# Patient Record
Sex: FEMALE | Race: WHITE
[De-identification: names, ages, dates, MRNs, and addresses within clinical notes are randomized per-mention and may not be internally consistent; named-entity substitution may affect disease eponyms.]

---

## 2019-03-22 ENCOUNTER — HOSPITAL ENCOUNTER (INPATIENT)
Dept: HOSPITAL 35 - ER | Age: 49
LOS: 5 days | Discharge: HOME | DRG: 442 | End: 2019-03-27
Attending: INTERNAL MEDICINE | Admitting: INTERNAL MEDICINE
Payer: COMMERCIAL

## 2019-03-22 VITALS — SYSTOLIC BLOOD PRESSURE: 125 MMHG | DIASTOLIC BLOOD PRESSURE: 79 MMHG

## 2019-03-22 VITALS — DIASTOLIC BLOOD PRESSURE: 71 MMHG | SYSTOLIC BLOOD PRESSURE: 120 MMHG

## 2019-03-22 VITALS — HEIGHT: 60.98 IN | BODY MASS INDEX: 32.28 KG/M2 | WEIGHT: 171 LBS

## 2019-03-22 VITALS — SYSTOLIC BLOOD PRESSURE: 126 MMHG | DIASTOLIC BLOOD PRESSURE: 74 MMHG

## 2019-03-22 VITALS — SYSTOLIC BLOOD PRESSURE: 108 MMHG | DIASTOLIC BLOOD PRESSURE: 63 MMHG

## 2019-03-22 VITALS — SYSTOLIC BLOOD PRESSURE: 131 MMHG | DIASTOLIC BLOOD PRESSURE: 75 MMHG

## 2019-03-22 DIAGNOSIS — N30.00: ICD-10-CM

## 2019-03-22 DIAGNOSIS — Z94.4: ICD-10-CM

## 2019-03-22 DIAGNOSIS — E11.65: ICD-10-CM

## 2019-03-22 DIAGNOSIS — Z79.4: ICD-10-CM

## 2019-03-22 DIAGNOSIS — Z88.8: ICD-10-CM

## 2019-03-22 DIAGNOSIS — K72.90: Primary | ICD-10-CM

## 2019-03-22 DIAGNOSIS — K74.60: ICD-10-CM

## 2019-03-22 DIAGNOSIS — Z79.899: ICD-10-CM

## 2019-03-22 LAB
ALBUMIN SERPL-MCNC: 3.2 G/DL (ref 3.4–5)
ALT SERPL-CCNC: 52 U/L (ref 30–65)
ANION GAP SERPL CALC-SCNC: 8 MMOL/L (ref 7–16)
AST SERPL-CCNC: 103 U/L (ref 15–37)
BASOPHILS NFR BLD AUTO: 1 % (ref 0–2)
BE(VIVO): 8.9 MMOL/L
BILIRUB SERPL-MCNC: 3.8 MG/DL
BILIRUB UR-MCNC: (no result) MG/DL
BUN SERPL-MCNC: 11 MG/DL (ref 7–18)
CALCIUM SERPL-MCNC: 10 MG/DL (ref 8.5–10.1)
CHLORIDE SERPL-SCNC: 92 MMOL/L (ref 98–107)
CO2 SERPL-SCNC: 33 MMOL/L (ref 21–32)
COLOR UR: YELLOW
CREAT SERPL-MCNC: 0.9 MG/DL (ref 0.6–1)
EOSINOPHIL NFR BLD: 3 % (ref 0–3)
ERYTHROCYTE [DISTWIDTH] IN BLOOD BY AUTOMATED COUNT: 13.9 % (ref 10.5–14.5)
GAS PNL BLDV: 87.4 MMHG (ref 35–45)
GLUCOSE SERPL-MCNC: 203 MG/DL (ref 74–106)
GRANULOCYTES NFR BLD MANUAL: 68 % (ref 36–66)
HCO3 BLD-SCNC: 33.6 MMOL/L (ref 22–26)
HCT VFR BLD CALC: 39.6 % (ref 37–47)
HGB BLD-MCNC: 14.2 GM/DL (ref 12–15)
KETONES UR STRIP-MCNC: (no result) MG/DL
LYMPHOCYTES NFR BLD AUTO: 14 % (ref 24–44)
MAGNESIUM SERPL-MCNC: 1.6 MG/DL (ref 1.8–2.4)
MCH RBC QN AUTO: 40.6 PG (ref 26–34)
MCHC RBC AUTO-ENTMCNC: 35.8 G/DL (ref 28–37)
MCV RBC: 113.3 FL (ref 80–100)
MONOCYTES NFR BLD: 9 % (ref 1–8)
NEUTROPHILS # BLD: 2.3 THOU/UL (ref 1.4–8.2)
NEUTS BAND NFR BLD: 5 % (ref 0–8)
PCO2 BLDV: 46.1 MMHG (ref 41–51)
PHOSPHATE SERPL-MCNC: 2.8 MG/DL (ref 2.5–4.9)
PLATELET # BLD: 121 THOU/UL (ref 150–400)
POTASSIUM SERPL-SCNC: 4.3 MMOL/L (ref 3.5–5.1)
PROT SERPL-MCNC: 7.3 G/DL (ref 6.4–8.2)
RBC # BLD AUTO: 3.5 MIL/UL (ref 4.2–5)
RBC # UR STRIP: NEGATIVE /UL
SODIUM SERPL-SCNC: 133 MMOL/L (ref 136–145)
SP GR UR STRIP: 1.02 (ref 1–1.03)
SQUAMOUS: (no result) /LPF (ref 0–3)
URINE CLARITY: CLEAR
URINE GLUCOSE-RANDOM*: (no result)
URINE LEUKOCYTES-REFLEX: NEGATIVE
URINE NITRITE-REFLEX: POSITIVE
URINE PROTEIN (DIPSTICK): (no result)
URINE WBC-REFLEX: (no result) /HPF (ref 0–5)
UROBILINOGEN UR STRIP-ACNC: 1 E.U./DL (ref 0.2–1)
WBC # BLD AUTO: 3.1 THOU/UL (ref 4–11)

## 2019-03-22 PROCEDURE — 10879: CPT

## 2019-03-23 VITALS — SYSTOLIC BLOOD PRESSURE: 120 MMHG | DIASTOLIC BLOOD PRESSURE: 71 MMHG

## 2019-03-23 VITALS — DIASTOLIC BLOOD PRESSURE: 68 MMHG | SYSTOLIC BLOOD PRESSURE: 122 MMHG

## 2019-03-23 VITALS — DIASTOLIC BLOOD PRESSURE: 71 MMHG | SYSTOLIC BLOOD PRESSURE: 138 MMHG

## 2019-03-23 VITALS — SYSTOLIC BLOOD PRESSURE: 109 MMHG | DIASTOLIC BLOOD PRESSURE: 54 MMHG

## 2019-03-23 VITALS — SYSTOLIC BLOOD PRESSURE: 123 MMHG | DIASTOLIC BLOOD PRESSURE: 69 MMHG

## 2019-03-23 LAB
ALBUMIN SERPL-MCNC: 2.5 G/DL (ref 3.4–5)
ALT SERPL-CCNC: 42 U/L (ref 30–65)
ANION GAP SERPL CALC-SCNC: 9 MMOL/L (ref 7–16)
AST SERPL-CCNC: 68 U/L (ref 15–37)
BILIRUB SERPL-MCNC: 2.4 MG/DL
BUN SERPL-MCNC: 11 MG/DL (ref 7–18)
CALCIUM SERPL-MCNC: 8.1 MG/DL (ref 8.5–10.1)
CHLORIDE SERPL-SCNC: 92 MMOL/L (ref 98–107)
CO2 SERPL-SCNC: 30 MMOL/L (ref 21–32)
CREAT SERPL-MCNC: 0.9 MG/DL (ref 0.6–1)
ERYTHROCYTE [DISTWIDTH] IN BLOOD BY AUTOMATED COUNT: 13.7 % (ref 10.5–14.5)
GLUCOSE SERPL-MCNC: 357 MG/DL (ref 74–106)
HCT VFR BLD CALC: 30.9 % (ref 37–47)
HGB BLD-MCNC: 11 GM/DL (ref 12–15)
MCH RBC QN AUTO: 40.9 PG (ref 26–34)
MCHC RBC AUTO-ENTMCNC: 35.7 G/DL (ref 28–37)
MCV RBC: 114.7 FL (ref 80–100)
PLATELET # BLD: 77 THOU/UL (ref 150–400)
POTASSIUM SERPL-SCNC: 3.8 MMOL/L (ref 3.5–5.1)
PROT SERPL-MCNC: 5.1 G/DL (ref 6.4–8.2)
RBC # BLD AUTO: 2.69 MIL/UL (ref 4.2–5)
SODIUM SERPL-SCNC: 131 MMOL/L (ref 136–145)
WBC # BLD AUTO: 2 THOU/UL (ref 4–11)

## 2019-03-23 NOTE — NUR
ASSUMED CARE OF PT DURING ADMISSION TO THE UNIT. A&Ox4, OCCASIONAL CONFUSION.
VS STABLE, SR ON TELE. GLUCOSE HIGH, INSULIN GIVEN. RECHECKED, HIGH AGAIN,
CONTACTED PHYSICIAN AND 4 ADDITIONAL UNITS OF NOVOLOG ORDERED AND GIVEN.
STATED SHE HAS NOT HAD DIABETIC/INSULIN EDUCATION. STATED SHE CAN FEEL WHEN
SUGAR IS DROPPING BUT NOT WHEN IT IS ELEVATED. SBA TO BR. SLOW BUT STEADY
GAIT. 1 BM OVER NOC. CURRENTLY RESTING. NO ACUTE DISTRESS. SLOW PROGRESSION
TOWARDS POC GOALS.

## 2019-03-23 NOTE — NUR
PT NEEDS EDUCATION ON DIABETES..DIET AND INSULIN..SHE TOLD PREVIOUS RN THAT
SHE WAS DIAGNOSED AS DIABETIC AT [place] IN [facility]  BUT SHE DISCHARGED A DAY
EARLY AND REFUSED HER DIABETIC EDUCATION.

## 2019-03-24 VITALS — SYSTOLIC BLOOD PRESSURE: 123 MMHG | DIASTOLIC BLOOD PRESSURE: 73 MMHG

## 2019-03-24 VITALS — SYSTOLIC BLOOD PRESSURE: 126 MMHG | DIASTOLIC BLOOD PRESSURE: 70 MMHG

## 2019-03-24 VITALS — SYSTOLIC BLOOD PRESSURE: 146 MMHG | DIASTOLIC BLOOD PRESSURE: 95 MMHG

## 2019-03-24 VITALS — SYSTOLIC BLOOD PRESSURE: 129 MMHG | DIASTOLIC BLOOD PRESSURE: 71 MMHG

## 2019-03-24 VITALS — SYSTOLIC BLOOD PRESSURE: 133 MMHG | DIASTOLIC BLOOD PRESSURE: 77 MMHG

## 2019-03-24 LAB
ALBUMIN SERPL-MCNC: 2 G/DL (ref 3.4–5)
ALT SERPL-CCNC: 39 U/L (ref 30–65)
ANION GAP SERPL CALC-SCNC: 5 MMOL/L (ref 7–16)
AST SERPL-CCNC: 62 U/L (ref 15–37)
BILIRUB SERPL-MCNC: 1.7 MG/DL
BUN SERPL-MCNC: 9 MG/DL (ref 7–18)
CALCIUM SERPL-MCNC: 8.7 MG/DL (ref 8.5–10.1)
CHLORIDE SERPL-SCNC: 98 MMOL/L (ref 98–107)
CO2 SERPL-SCNC: 34 MMOL/L (ref 21–32)
CREAT SERPL-MCNC: 0.9 MG/DL (ref 0.6–1)
ERYTHROCYTE [DISTWIDTH] IN BLOOD BY AUTOMATED COUNT: 13.5 % (ref 10.5–14.5)
GLUCOSE SERPL-MCNC: 538 MG/DL (ref 74–106)
HCT VFR BLD CALC: 30.1 % (ref 37–47)
HGB BLD-MCNC: 10.9 GM/DL (ref 12–15)
MAGNESIUM SERPL-MCNC: 1.6 MG/DL (ref 1.8–2.4)
MCH RBC QN AUTO: 41.3 PG (ref 26–34)
MCHC RBC AUTO-ENTMCNC: 36.2 G/DL (ref 28–37)
MCV RBC: 114.2 FL (ref 80–100)
PLATELET # BLD: 63 THOU/UL (ref 150–400)
POTASSIUM SERPL-SCNC: 4 MMOL/L (ref 3.5–5.1)
PROT SERPL-MCNC: 5.4 G/DL (ref 6.4–8.2)
RBC # BLD AUTO: 2.64 MIL/UL (ref 4.2–5)
SODIUM SERPL-SCNC: 137 MMOL/L (ref 136–145)
WBC # BLD AUTO: 1.5 THOU/UL (ref 4–11)

## 2019-03-24 NOTE — NUR
HAVE BEEN STARTING TO EDUCATE PATIENT ON HER DIABETES...SHE STATES AT HOME SHE
WILL GO FOR 1-2 DAYS WITH NO FOOD INTAKE..ALWAYS HAS POOR APPETITE..CONSULTED
NUTRITION TO EDUCATE HER ON PROPER DIET AND SENT A MESSAGE TO [facility] TO TRY TO
FIND A SUPPORT GROUP SHE CAN ATTEND TO FOLOW UP HER EDUCATION..

## 2019-03-24 NOTE — NUR
PATIENT IS ALERT AND ORIENTED. PATIENT STATED FEELS GROGGY STILL. PATIENT
NEEDS TO BE EDUCATED STILL ON DM MANAGEMENT. PATIENT IS UP TIMES ONE WITH
GAIT BELT. PATIENTS AMMONIA LEVELS HAVE IMPROVED. PATIEN IS NSR ON TELE.
PATIENT IS RESTING COMFORTABLY IN BED. WCM. PATIENT IS PROGRESSING TO GOALS.

## 2019-03-25 VITALS — SYSTOLIC BLOOD PRESSURE: 131 MMHG | DIASTOLIC BLOOD PRESSURE: 79 MMHG

## 2019-03-25 VITALS — SYSTOLIC BLOOD PRESSURE: 140 MMHG | DIASTOLIC BLOOD PRESSURE: 84 MMHG

## 2019-03-25 VITALS — SYSTOLIC BLOOD PRESSURE: 134 MMHG | DIASTOLIC BLOOD PRESSURE: 71 MMHG

## 2019-03-25 LAB
ALBUMIN SERPL-MCNC: 2.3 G/DL (ref 3.4–5)
ALT SERPL-CCNC: 38 U/L (ref 30–65)
ANION GAP SERPL CALC-SCNC: 7 MMOL/L (ref 7–16)
AST SERPL-CCNC: 54 U/L (ref 15–37)
BILIRUB SERPL-MCNC: 1.6 MG/DL
BUN SERPL-MCNC: 8 MG/DL (ref 7–18)
CALCIUM SERPL-MCNC: 8.9 MG/DL (ref 8.5–10.1)
CHLORIDE SERPL-SCNC: 100 MMOL/L (ref 98–107)
CO2 SERPL-SCNC: 32 MMOL/L (ref 21–32)
CREAT SERPL-MCNC: 0.8 MG/DL (ref 0.6–1)
ERYTHROCYTE [DISTWIDTH] IN BLOOD BY AUTOMATED COUNT: 13.8 % (ref 10.5–14.5)
EST. AVERAGE GLUCOSE BLD GHB EST-MCNC: 209 MG/DL
GLUCOSE SERPL-MCNC: 365 MG/DL (ref 74–106)
GLYCOHEMOGLOBIN (HGB A1C): 8.9 % (ref 4.8–5.6)
HCT VFR BLD CALC: 30.9 % (ref 37–47)
HGB BLD-MCNC: 11.1 GM/DL (ref 12–15)
MCH RBC QN AUTO: 41.3 PG (ref 26–34)
MCHC RBC AUTO-ENTMCNC: 36 G/DL (ref 28–37)
MCV RBC: 114.8 FL (ref 80–100)
PLATELET # BLD: 68 THOU/UL (ref 150–400)
POTASSIUM SERPL-SCNC: 3.6 MMOL/L (ref 3.5–5.1)
PROT SERPL-MCNC: 5.3 G/DL (ref 6.4–8.2)
RBC # BLD AUTO: 2.69 MIL/UL (ref 4.2–5)
SODIUM SERPL-SCNC: 139 MMOL/L (ref 136–145)
WBC # BLD AUTO: 1.8 THOU/UL (ref 4–11)

## 2019-03-25 NOTE — NUR
PATIENT IS ALERT AND ORIENTED. PATIENT IS SBA. PATIENTS AMMONIA HAS IMPROVED.
PATIENT IS NSR ON TELE. PATIENTS LBM WAS THE 24TH. PATIENT HAS NOT DISPLAYED
ANY SIGN OF WITHDRAWL. PATIENT REQUIRES SOME EDUCATION ON DM MANAGEMENT.
PATIENT HAS A DM BOOK WILL FOLLOW UP WITH QUESTIONS. PATIENT IS RESTING
COMFORTABLY IN BED. WCM. PATIENT IS PROGRESSING TO GOALS.

## 2019-03-25 NOTE — NUR
INITIAL ASSESSMENT:
Pt evaluated for d/c planning needs.  Reviewed chart and spoke with nurse, pt
and SO at bedside.  Pt lives with SO and was independent with ADl's.  Pt uses
no DME and had [facility] in the past.  Pt plans on returning home on d/c from
hospital.  Will remain available to assist as needed.

## 2019-03-25 NOTE — NUR
PT GIVEN INFO FROM DIETICIAN REGARDING DIABETIC DIET...ENCOURAGED IMPORTANCE
OF EATING REGULAR MEALS WITH HEALTHY CHOICES...

## 2019-03-26 VITALS — DIASTOLIC BLOOD PRESSURE: 61 MMHG | SYSTOLIC BLOOD PRESSURE: 99 MMHG

## 2019-03-26 VITALS — DIASTOLIC BLOOD PRESSURE: 69 MMHG | SYSTOLIC BLOOD PRESSURE: 127 MMHG

## 2019-03-26 VITALS — DIASTOLIC BLOOD PRESSURE: 69 MMHG | SYSTOLIC BLOOD PRESSURE: 133 MMHG

## 2019-03-26 VITALS — DIASTOLIC BLOOD PRESSURE: 81 MMHG | SYSTOLIC BLOOD PRESSURE: 116 MMHG

## 2019-03-26 VITALS — DIASTOLIC BLOOD PRESSURE: 78 MMHG | SYSTOLIC BLOOD PRESSURE: 118 MMHG

## 2019-03-26 LAB
ERYTHROCYTE [DISTWIDTH] IN BLOOD BY AUTOMATED COUNT: 13.7 % (ref 10.5–14.5)
HCT VFR BLD CALC: 31.6 % (ref 37–47)
HGB BLD-MCNC: 11.4 GM/DL (ref 12–15)
MCH RBC QN AUTO: 41.3 PG (ref 26–34)
MCHC RBC AUTO-ENTMCNC: 36 G/DL (ref 28–37)
MCV RBC: 114.8 FL (ref 80–100)
PLATELET # BLD: 71 THOU/UL (ref 150–400)
RBC # BLD AUTO: 2.75 MIL/UL (ref 4.2–5)
WBC # BLD AUTO: 1.6 THOU/UL (ref 4–11)

## 2019-03-26 NOTE — NUR
CONTINUE TO REINFORCE IMPORTANCE OF EATING MEALS AND STICKING TO DIET AS
ORDERED...SHE DID EAT MUCH BETTER AT LUNCH...

## 2019-03-26 NOTE — NUR
SW reviewed chart and spoke with nursing. Pt is progressing towards goals for
discharge. SW met with pt at bedside to discuss discharge plan. Pt states she
would be agreeable with HH services if ordered. [facility]
and would be agreeable to using them again. Pt requested SW contact her
fianceRenard.  Pt needs oversight in checking blood sugar and administering
insulin. Pt's PCP is Dr. Sullivan. SW left voice message for pt's fiance, to
provide update and discuss discharge plan. SW notified intake at [facility] of new
HH referral. SW is following to assist as needed with discharge planning.

## 2019-03-27 VITALS — DIASTOLIC BLOOD PRESSURE: 65 MMHG | SYSTOLIC BLOOD PRESSURE: 126 MMHG

## 2019-03-27 VITALS — SYSTOLIC BLOOD PRESSURE: 128 MMHG | DIASTOLIC BLOOD PRESSURE: 66 MMHG

## 2019-03-27 VITALS — SYSTOLIC BLOOD PRESSURE: 116 MMHG | DIASTOLIC BLOOD PRESSURE: 57 MMHG

## 2019-03-27 VITALS — SYSTOLIC BLOOD PRESSURE: 126 MMHG | DIASTOLIC BLOOD PRESSURE: 65 MMHG

## 2019-03-27 NOTE — NUR
ASSUMED PATIENT CARE AT 0700. A/O X4. NO DISDRESS NOTED. DIABETES EDUCATION
GIVEN TO PATIENT AND FIANCE. DC TO [facility] NOW.

## 2019-03-27 NOTE — NUR
PT IN BED, SLEEPING. PT TOLERATED AMBULATING TO BATHROOM THROUGHOUT SHIFT. PT
SR ON MONITOR. PT HAD NO C/O PAIN THROUGHOUT SHIFT.

## 2019-03-30 ENCOUNTER — HOSPITAL ENCOUNTER (EMERGENCY)
Dept: HOSPITAL 35 - ER | Age: 49
Discharge: HOME | End: 2019-03-30
Payer: COMMERCIAL

## 2019-03-30 VITALS — BODY MASS INDEX: 31.47 KG/M2 | WEIGHT: 171.01 LBS | HEIGHT: 62 IN

## 2019-03-30 VITALS — SYSTOLIC BLOOD PRESSURE: 118 MMHG | DIASTOLIC BLOOD PRESSURE: 72 MMHG

## 2019-03-30 DIAGNOSIS — R11.0: ICD-10-CM

## 2019-03-30 DIAGNOSIS — T38.3X5A: ICD-10-CM

## 2019-03-30 DIAGNOSIS — I10: ICD-10-CM
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
## 2019-03-30 DIAGNOSIS — E11.649: Primary | ICD-10-CM

## 2019-03-30 DIAGNOSIS — K21.9: ICD-10-CM

## 2019-03-30 DIAGNOSIS — Z88.8: ICD-10-CM

## 2019-03-30 DIAGNOSIS — Z79.4: ICD-10-CM

## 2019-03-30 LAB
ALBUMIN SERPL-MCNC: 2.7 G/DL (ref 3.4–5)
ALT SERPL-CCNC: 46 U/L (ref 30–65)
ANION GAP SERPL CALC-SCNC: 11 MMOL/L (ref 7–16)
AST SERPL-CCNC: 104 U/L (ref 15–37)
BASOPHILS NFR BLD AUTO: 0.5 % (ref 0–2)
BILIRUB DIRECT SERPL-MCNC: 0.6 MG/DL
BILIRUB SERPL-MCNC: 1.2 MG/DL
BILIRUB UR-MCNC: NEGATIVE MG/DL
BUN SERPL-MCNC: 6 MG/DL (ref 7–18)
CALCIUM SERPL-MCNC: 9.2 MG/DL (ref 8.5–10.1)
CHLORIDE SERPL-SCNC: 101 MMOL/L (ref 98–107)
CO2 SERPL-SCNC: 26 MMOL/L (ref 21–32)
COLOR UR: YELLOW
CREAT SERPL-MCNC: 0.7 MG/DL (ref 0.6–1)
EOSINOPHIL NFR BLD: 0.3 % (ref 0–3)
ERYTHROCYTE [DISTWIDTH] IN BLOOD BY AUTOMATED COUNT: 14.3 % (ref 10.5–14.5)
GLUCOSE SERPL-MCNC: 95 MG/DL (ref 74–106)
GRANULOCYTES NFR BLD MANUAL: 69.5 % (ref 36–66)
HCT VFR BLD CALC: 33.1 % (ref 37–47)
HGB BLD-MCNC: 11.8 GM/DL (ref 12–15)
KETONES UR STRIP-MCNC: NEGATIVE MG/DL
LIPASE: 103 U/L (ref 73–393)
LYMPHOCYTES NFR BLD AUTO: 18.8 % (ref 24–44)
MCH RBC QN AUTO: 41.2 PG (ref 26–34)
MCHC RBC AUTO-ENTMCNC: 35.5 G/DL (ref 28–37)
MCV RBC: 115.9 FL (ref 80–100)
MONOCYTES NFR BLD: 10.9 % (ref 1–8)
NEUTROPHILS # BLD: 2.1 THOU/UL (ref 1.4–8.2)
PLATELET # BLD: 115 THOU/UL (ref 150–400)
POTASSIUM SERPL-SCNC: 3.5 MMOL/L (ref 3.5–5.1)
PROT SERPL-MCNC: 6.4 G/DL (ref 6.4–8.2)
RBC # BLD AUTO: 2.86 MIL/UL (ref 4.2–5)
RBC # UR STRIP: NEGATIVE /UL
SODIUM SERPL-SCNC: 138 MMOL/L (ref 136–145)
SP GR UR STRIP: <= 1.005 (ref 1–1.03)
URINE CLARITY: CLEAR
URINE GLUCOSE-RANDOM*: NEGATIVE
URINE LEUKOCYTES-REFLEX: NEGATIVE
URINE NITRITE-REFLEX: NEGATIVE
URINE PROTEIN (DIPSTICK): NEGATIVE
UROBILINOGEN UR STRIP-ACNC: 0.2 E.U./DL (ref 0.2–1)
WBC # BLD AUTO: 3 THOU/UL (ref 4–11)

## 2019-04-10 ENCOUNTER — HOSPITAL ENCOUNTER (EMERGENCY)
Dept: HOSPITAL 35 - ER | Age: 49
Discharge: HOME | End: 2019-04-10
Payer: COMMERCIAL

## 2019-04-10 VITALS — DIASTOLIC BLOOD PRESSURE: 85 MMHG | SYSTOLIC BLOOD PRESSURE: 143 MMHG

## 2019-04-10 VITALS — WEIGHT: 171.01 LBS | BODY MASS INDEX: 31.47 KG/M2 | HEIGHT: 62 IN

## 2019-04-10 DIAGNOSIS — D53.9: ICD-10-CM

## 2019-04-10 DIAGNOSIS — I10: ICD-10-CM

## 2019-04-10 DIAGNOSIS — E11.65: Primary | ICD-10-CM

## 2019-04-10 DIAGNOSIS — Z88.8: ICD-10-CM

## 2019-04-10 DIAGNOSIS — F10.129: ICD-10-CM

## 2019-04-10 DIAGNOSIS — K21.9: ICD-10-CM

## 2019-04-10 LAB
ANION GAP SERPL CALC-SCNC: 4 MMOL/L (ref 7–16)
ANISOCYTOSIS BLD QL SMEAR: (no result)
BASOPHILS NFR BLD AUTO: 1 % (ref 0–2)
BILIRUB UR-MCNC: NEGATIVE MG/DL
BUN SERPL-MCNC: 7 MG/DL (ref 7–18)
CALCIUM SERPL-MCNC: 8 MG/DL (ref 8.5–10.1)
CHLORIDE SERPL-SCNC: 98 MMOL/L (ref 98–107)
CO2 SERPL-SCNC: 35 MMOL/L (ref 21–32)
COLOR UR: YELLOW
CREAT SERPL-MCNC: 0.7 MG/DL (ref 0.6–1)
EOSINOPHIL NFR BLD: 1 % (ref 0–3)
ERYTHROCYTE [DISTWIDTH] IN BLOOD BY AUTOMATED COUNT: 14.2 % (ref 10.5–14.5)
GLUCOSE SERPL-MCNC: 146 MG/DL (ref 74–106)
GRANULOCYTES NFR BLD MANUAL: 47 % (ref 36–66)
HCT VFR BLD CALC: 36.1 % (ref 37–47)
HGB BLD-MCNC: 12.7 GM/DL (ref 12–15)
KETONES UR STRIP-MCNC: NEGATIVE MG/DL
LYMPHOCYTES NFR BLD AUTO: 44 % (ref 24–44)
MACROCYTES: (no result)
MCH RBC QN AUTO: 40.6 PG (ref 26–34)
MCHC RBC AUTO-ENTMCNC: 35.3 G/DL (ref 28–37)
MCV RBC: 115.2 FL (ref 80–100)
MONOCYTES NFR BLD: 7 % (ref 1–8)
NEUTROPHILS # BLD: 1.2 THOU/UL (ref 1.4–8.2)
NITRITE UR QL STRIP: NEGATIVE
PLATELET # BLD EST: (no result) 10*3/UL
PLATELET # BLD: 99 THOU/UL (ref 150–400)
POTASSIUM SERPL-SCNC: 3 MMOL/L (ref 3.5–5.1)
RBC # BLD AUTO: 3.14 MIL/UL (ref 4.2–5)
RBC # UR STRIP: NEGATIVE /UL
SODIUM SERPL-SCNC: 137 MMOL/L (ref 136–145)
SP GR UR STRIP: <= 1.005 (ref 1–1.03)
URINE CLARITY: CLEAR
URINE GLUCOSE-RANDOM*: NEGATIVE
URINE LEUKOCYTES: NEGATIVE
URINE PROTEIN (DIPSTICK): NEGATIVE
UROBILINOGEN UR STRIP-ACNC: 1 E.U./DL (ref 0.2–1)
WBC # BLD AUTO: 2.6 THOU/UL (ref 4–11)

## 2019-04-20 ENCOUNTER — HOSPITAL ENCOUNTER (INPATIENT)
Dept: HOSPITAL 35 - ER | Age: 49
LOS: 2 days | Discharge: HOME | DRG: 638 | End: 2019-04-22
Attending: INTERNAL MEDICINE | Admitting: INTERNAL MEDICINE
Payer: COMMERCIAL

## 2019-04-20 VITALS — BODY MASS INDEX: 31.07 KG/M2 | WEIGHT: 171 LBS | HEIGHT: 62.01 IN

## 2019-04-20 VITALS — SYSTOLIC BLOOD PRESSURE: 139 MMHG | DIASTOLIC BLOOD PRESSURE: 81 MMHG

## 2019-04-20 DIAGNOSIS — K21.9: ICD-10-CM
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
## 2019-04-20 DIAGNOSIS — E87.6: ICD-10-CM

## 2019-04-20 DIAGNOSIS — Z94.4: ICD-10-CM

## 2019-04-20 DIAGNOSIS — E11.65: Primary | ICD-10-CM

## 2019-04-20 DIAGNOSIS — Z88.8: ICD-10-CM

## 2019-04-20 DIAGNOSIS — E83.42: ICD-10-CM

## 2019-04-20 DIAGNOSIS — Z91.14: ICD-10-CM

## 2019-04-20 DIAGNOSIS — D89.9: ICD-10-CM

## 2019-04-20 DIAGNOSIS — F10.129: ICD-10-CM

## 2019-04-20 DIAGNOSIS — Z79.899: ICD-10-CM

## 2019-04-20 DIAGNOSIS — K70.30: ICD-10-CM

## 2019-04-20 DIAGNOSIS — Z91.11: ICD-10-CM

## 2019-04-20 LAB
ALBUMIN SERPL-MCNC: 2.9 G/DL (ref 3.4–5)
ALT SERPL-CCNC: 46 U/L (ref 30–65)
ANION GAP SERPL CALC-SCNC: 7 MMOL/L (ref 7–16)
AST SERPL-CCNC: 128 U/L (ref 15–37)
BASOPHILS NFR BLD AUTO: 0.4 % (ref 0–2)
BILIRUB SERPL-MCNC: 2 MG/DL
BILIRUB UR-MCNC: NEGATIVE MG/DL
BUN SERPL-MCNC: 8 MG/DL (ref 7–18)
CALCIUM SERPL-MCNC: 8.2 MG/DL (ref 8.5–10.1)
CHLORIDE SERPL-SCNC: 95 MMOL/L (ref 98–107)
CO2 SERPL-SCNC: 36 MMOL/L (ref 21–32)
COLOR UR: YELLOW
CREAT SERPL-MCNC: 0.6 MG/DL (ref 0.6–1)
EOSINOPHIL NFR BLD: 1.3 % (ref 0–3)
ERYTHROCYTE [DISTWIDTH] IN BLOOD BY AUTOMATED COUNT: 14 % (ref 10.5–14.5)
GLUCOSE SERPL-MCNC: 130 MG/DL (ref 74–106)
GRANULOCYTES NFR BLD MANUAL: 51.1 % (ref 36–66)
HCT VFR BLD CALC: 39.1 % (ref 37–47)
HGB BLD-MCNC: 13.6 GM/DL (ref 12–15)
KETONES UR STRIP-MCNC: NEGATIVE MG/DL
LYMPHOCYTES NFR BLD AUTO: 36.7 % (ref 24–44)
MACROCYTES: (no result)
MAGNESIUM SERPL-MCNC: 0.8 MG/DL (ref 1.8–2.4)
MCH RBC QN AUTO: 40 PG (ref 26–34)
MCHC RBC AUTO-ENTMCNC: 34.9 G/DL (ref 28–37)
MCV RBC: 114.8 FL (ref 80–100)
MONOCYTES NFR BLD: 10.5 % (ref 1–8)
NEUTROPHILS # BLD: 2.1 THOU/UL (ref 1.4–8.2)
PLATELET # BLD: 160 THOU/UL (ref 150–400)
POTASSIUM SERPL-SCNC: 3 MMOL/L (ref 3.5–5.1)
PROT SERPL-MCNC: 6.6 G/DL (ref 6.4–8.2)
RBC # BLD AUTO: 3.41 MIL/UL (ref 4.2–5)
RBC # UR STRIP: NEGATIVE /UL
SODIUM SERPL-SCNC: 138 MMOL/L (ref 136–145)
SP GR UR STRIP: <= 1.005 (ref 1–1.03)
TROPONIN I SERPL-MCNC: <0.06 NG/ML (ref ?–0.06)
URINE CLARITY: CLEAR
URINE GLUCOSE-RANDOM*: NEGATIVE
URINE LEUKOCYTES-REFLEX: (no result)
URINE NITRITE-REFLEX: NEGATIVE
URINE PROTEIN (DIPSTICK): NEGATIVE
UROBILINOGEN UR STRIP-ACNC: 0.2 E.U./DL (ref 0.2–1)
WBC # BLD AUTO: 4 THOU/UL (ref 4–11)

## 2019-04-20 PROCEDURE — 10879: CPT

## 2019-04-21 VITALS — SYSTOLIC BLOOD PRESSURE: 150 MMHG | DIASTOLIC BLOOD PRESSURE: 76 MMHG

## 2019-04-21 VITALS — DIASTOLIC BLOOD PRESSURE: 58 MMHG | SYSTOLIC BLOOD PRESSURE: 120 MMHG

## 2019-04-21 VITALS — SYSTOLIC BLOOD PRESSURE: 134 MMHG | DIASTOLIC BLOOD PRESSURE: 76 MMHG

## 2019-04-21 VITALS — SYSTOLIC BLOOD PRESSURE: 126 MMHG | DIASTOLIC BLOOD PRESSURE: 84 MMHG

## 2019-04-21 VITALS — SYSTOLIC BLOOD PRESSURE: 154 MMHG | DIASTOLIC BLOOD PRESSURE: 85 MMHG

## 2019-04-21 VITALS — SYSTOLIC BLOOD PRESSURE: 145 MMHG | DIASTOLIC BLOOD PRESSURE: 85 MMHG

## 2019-04-21 VITALS — SYSTOLIC BLOOD PRESSURE: 157 MMHG | DIASTOLIC BLOOD PRESSURE: 95 MMHG

## 2019-04-21 VITALS — DIASTOLIC BLOOD PRESSURE: 84 MMHG | SYSTOLIC BLOOD PRESSURE: 126 MMHG

## 2019-04-21 LAB — FOLATE SERPL-MCNC: 11.9 NG/ML (ref 8.6–58.9)

## 2019-04-21 NOTE — NUR
PATIENT WAS A NEW ADMISSION TO THE UNIT THIS SHIFT. FULLY ORIENTED, PATIENT
WAS ABLE TO PARTICIPATE FULLY IN ADMISSION. CIWA PER PROTOCOL WITH PATIENT
SCORING ZERO. VITAL SIGNS STABLE WITH PATIENT HAVING NO COMPLAINTS OF PAIN OR
NAUSEA. PATIENT WAS ABLE TO AMBULATE TO RESTROOM WITH STANDBY ASSISTANCE
INCIDENT FREE. CONTINUE PLAN OF CARE.

## 2019-04-21 NOTE — NUR
Assumed care of patient at 0700. Patient is A&Ox4. NRS and clear lungs.  Her
vitals are stable but BP has increased throughout day. CIWA protocol in place.
Morning and mid-day assessments were 0. By end of day assessment she is scored
at 8. Some sweating, tremors, and anxiety. Spoke with her at length about what
was happening and what we are watching for. Pt expressed greater understanding
of withdrawl process and what to potentially expect. At this time she is up ad
hattie with standby assist. She has received electrolytes to help correct
imbalances. She is slowly progressing toward POC and discharge goals. Will
continue to monitor and assess.

## 2019-04-21 NOTE — NUR
PATIENT IS A NEW ADMISSION TO THE UNIT THIS SHIFT. SHE ARRIVED VIA CART FROM
THE ER AND WAS ABLE TO AMBULATE TO THE RESTROOM AND BED WITHOUT INCIDENT.
PATIENT IS ALERT AND ORIENTED AND ABLE TO PARTICIPATE IN ADMISSION PROCESS.
NURSE TO COMPLETE ADMISSION AND INITIATE CARE PLAN.

## 2019-04-22 VITALS — DIASTOLIC BLOOD PRESSURE: 89 MMHG | SYSTOLIC BLOOD PRESSURE: 155 MMHG

## 2019-04-22 VITALS — SYSTOLIC BLOOD PRESSURE: 154 MMHG | DIASTOLIC BLOOD PRESSURE: 86 MMHG

## 2019-04-22 VITALS — DIASTOLIC BLOOD PRESSURE: 96 MMHG | SYSTOLIC BLOOD PRESSURE: 166 MMHG

## 2019-04-22 VITALS — DIASTOLIC BLOOD PRESSURE: 86 MMHG | SYSTOLIC BLOOD PRESSURE: 154 MMHG

## 2019-04-22 LAB
ANION GAP SERPL CALC-SCNC: 9 MMOL/L (ref 7–16)
BUN SERPL-MCNC: 5 MG/DL (ref 7–18)
CALCIUM SERPL-MCNC: 8 MG/DL (ref 8.5–10.1)
CHLORIDE SERPL-SCNC: 98 MMOL/L (ref 98–107)
CO2 SERPL-SCNC: 31 MMOL/L (ref 21–32)
CREAT SERPL-MCNC: 0.7 MG/DL (ref 0.6–1)
GLUCOSE SERPL-MCNC: 305 MG/DL (ref 74–106)
MAGNESIUM SERPL-MCNC: 1.4 MG/DL (ref 1.8–2.4)
PHOSPHATE SERPL-MCNC: 3.2 MG/DL (ref 2.5–4.9)
POTASSIUM SERPL-SCNC: 3.6 MMOL/L (ref 3.5–5.1)
SODIUM SERPL-SCNC: 138 MMOL/L (ref 136–145)

## 2019-04-22 NOTE — NUR
INITIAL ASSESSMENT/DISCHARGE NOTE:
TMOAS reviewed chart and spoke with nursing and attending physician. Pt was
admitted from home due to ETOH abuse. Pt with hx of liver transplant about two
years ago. Pt with hx of noncompliance with her diabetes. Pt is on insulin. Pt
has discharge orders to go home today. Attending physician requests pt be
provided with diabetes education materials and ETOH treatment options. TOMAS met
with pt at bedside. Introduced role of SW. Pt is alert/orientated. Pt reports
she lives at home with her s/o, Renard. Prior to admission, pt was
independent with ADLs. Pt has used [facility] in the past. Pt states she does not
want HH services. SW provided pt with information for the [employer] in [place], [place] and the Diabetes outpatient clinics at [facility] and [facility]. Pt verbalized understanding. SW
discussed her ETOH consumption. Pt states she does drink more ETOH, than she
eats. SW explained that diet is important to manage with her diabetes. Pt
states they are going to get all of the ETOH out of the house. Pt states she
does want to get her ETOH consumption under control. TOMAS offered to contact an
ETOH treatment facility/. Pt agreeable TOMAS spoke with Bonnie at
[facility] [place], who states she will contact pt tomorrow to discuss options
with pt. TOMAS provided Bonnie with pt's contact info. TOMAS provided pt with Bonnie'
contact info. Pt's s/o to provide transportation home. No additional SW needs
identified at this time, but is available to assist should needs arise.

## 2019-04-22 NOTE — NUR
Assess due to dx hyperglycemia.  Hx diabetes, liver transplant.  +etoh use and
[facility] has been consulted.  Pt slow to respond to some questions but able to
state appetite has been decreased and does not like food as much as recent
last admit.  Has menu, trying to order own foods.  A1C 8.9, received diet
education last admit but expect poor compliance as pt still using increased
amounts etoh at home.  Pt did ask about OP diabetes education-will provide
information.  Low nutrition risk.

## 2019-04-22 NOTE — NUR
Assumed care at 1845. Pt resting in bed. AOX4. Scored a 5 on last CIWA. She's
been ambulating stand by assist to the bathroom. VSS. Denies nause and
malaise. SR on TELE. NS @80 R AC. No identified needs at the moment. Will
continue to monitor.

## 2021-11-30 NOTE — NUR
DISCHARGE NOTE:
SW reviewed chart and spoke with nursing. Pt has discharge orders to return
home today. SW met with pt and Renard mobley, at bedside to discuss
discharge plan. Both pt and itz state that they will be monitoring pt's
blood sugar as needed, and decline HH services. [facility]
Pt's fiance to provide transportation home. No additional SW needs identified
at this time, but is available to assist should needs arise. Scribe Attestation (For Scribes USE Only)... Attending Attestation (For Attendings USE Only).../Scribe Attestation (For Scribes USE Only)...